# Patient Record
Sex: FEMALE | Race: WHITE | Employment: FULL TIME | URBAN - NONMETROPOLITAN AREA
[De-identification: names, ages, dates, MRNs, and addresses within clinical notes are randomized per-mention and may not be internally consistent; named-entity substitution may affect disease eponyms.]

---

## 2018-01-09 ENCOUNTER — OFFICE VISIT (OUTPATIENT)
Dept: FAMILY MEDICINE CLINIC | Facility: CLINIC | Age: 23
End: 2018-01-09

## 2018-01-09 VITALS
WEIGHT: 214 LBS | SYSTOLIC BLOOD PRESSURE: 120 MMHG | DIASTOLIC BLOOD PRESSURE: 70 MMHG | TEMPERATURE: 98 F | HEIGHT: 60.5 IN | BODY MASS INDEX: 40.93 KG/M2

## 2018-01-09 DIAGNOSIS — F51.5 NIGHTMARES: ICD-10-CM

## 2018-01-09 DIAGNOSIS — Z79.899 ENCOUNTER FOR LONG-TERM (CURRENT) USE OF MEDICATIONS: Primary | ICD-10-CM

## 2018-01-09 DIAGNOSIS — F42.2 MIXED OBSESSIONAL THOUGHTS AND ACTS: ICD-10-CM

## 2018-01-09 DIAGNOSIS — Z30.41 ENCOUNTER FOR SURVEILLANCE OF CONTRACEPTIVE PILLS: ICD-10-CM

## 2018-01-09 PROCEDURE — 99202 OFFICE O/P NEW SF 15 MIN: CPT | Performed by: FAMILY MEDICINE

## 2018-01-09 RX ORDER — PRAZOSIN HYDROCHLORIDE 1 MG/1
2 CAPSULE ORAL NIGHTLY
Qty: 60 CAPSULE | Refills: 11 | Status: SHIPPED | OUTPATIENT
Start: 2018-01-09 | End: 2019-01-04

## 2018-01-09 RX ORDER — FLUVOXAMINE MALEATE 100 MG
250 TABLET ORAL NIGHTLY
COMMUNITY
End: 2018-01-09

## 2018-01-09 RX ORDER — FLUVOXAMINE MALEATE 100 MG
250 TABLET ORAL NIGHTLY
Qty: 75 TABLET | Refills: 11 | Status: SHIPPED | OUTPATIENT
Start: 2018-01-09 | End: 2019-02-18

## 2018-01-09 NOTE — PATIENT INSTRUCTIONS
Prazosin capsules  Brand Name: Deon Novak  What is this medicine? PRAZOSIN (PRA belle sin) is an antihypertensive. It works by relaxing the blood vessels. It is used to treat high blood pressure. How should I use this medicine?   Take this medicine by tess They need to know if you have any of the following conditions:  · kidney disease  · an unusual or allergic reaction to prazosin, other medicines, foods, dyes, or preservatives  · pregnant or trying to get pregnant  · breast-feeding  What should I watch for

## 2018-01-09 NOTE — PROGRESS NOTES
Jose Hastings is a 25year old female.   Patient presents with:  Establish Care: RM 6  Medication Request: ALSO DISCUSS PROZINE-- HAS NOT TAKEN IN 2 MONTHS      HPI:   Moved here from [place]  Wants to establish locally  She  Needs meds           Has been on Items Addressed This Visit        Mental Health    Nightmares    Overview     Controlled with prazosin         Relevant Medications    Prazosin HCl 1 MG Oral Cap       Other    OCD Mixed obsessional thoughts and acts    Overview     ON FLUVOXAMINE   Contro

## 2018-02-05 ENCOUNTER — TELEPHONE (OUTPATIENT)
Dept: FAMILY MEDICINE CLINIC | Facility: CLINIC | Age: 23
End: 2018-02-05

## 2018-02-05 DIAGNOSIS — Z30.41 ENCOUNTER FOR SURVEILLANCE OF CONTRACEPTIVE PILLS: ICD-10-CM

## 2018-03-09 ENCOUNTER — TELEPHONE (OUTPATIENT)
Dept: FAMILY MEDICINE CLINIC | Facility: CLINIC | Age: 23
End: 2018-03-09

## 2018-03-09 DIAGNOSIS — Z30.41 ENCOUNTER FOR SURVEILLANCE OF CONTRACEPTIVE PILLS: ICD-10-CM

## 2018-04-03 ENCOUNTER — TELEPHONE (OUTPATIENT)
Dept: FAMILY MEDICINE CLINIC | Facility: CLINIC | Age: 23
End: 2018-04-03

## 2018-04-03 DIAGNOSIS — Z30.41 ENCOUNTER FOR SURVEILLANCE OF CONTRACEPTIVE PILLS: ICD-10-CM

## 2018-05-04 DIAGNOSIS — Z30.41 ENCOUNTER FOR SURVEILLANCE OF CONTRACEPTIVE PILLS: ICD-10-CM

## 2018-05-04 NOTE — TELEPHONE ENCOUNTER
Norethindrone-Eth Estradiol (NORTREL 1/35, 28,) 1-35 MG-MCG Oral Tab   PLEASE SEND REFILL TO [facility] IN [facility]

## 2018-05-07 RX ORDER — NORETHINDRONE AND ETHINYL ESTRADIOL 1 MG-35MCG
1 KIT ORAL DAILY
Qty: 28 TABLET | Refills: 0 | OUTPATIENT
Start: 2018-05-07

## 2018-09-16 DIAGNOSIS — Z30.41 ENCOUNTER FOR SURVEILLANCE OF CONTRACEPTIVE PILLS: ICD-10-CM

## 2018-09-17 RX ORDER — NORETHINDRONE AND ETHINYL ESTRADIOL 1 MG-35MCG
KIT ORAL
Qty: 28 TABLET | Refills: 3 | Status: SHIPPED | OUTPATIENT
Start: 2018-09-17

## 2019-02-18 DIAGNOSIS — F42.2 MIXED OBSESSIONAL THOUGHTS AND ACTS: ICD-10-CM

## 2019-02-18 NOTE — TELEPHONE ENCOUNTER
Last office visit: 01/09/2018  Last refill: 1/09/2018  Requested Prescriptions     Pending Prescriptions Disp Refills   • FLUVOXAMINE MALEATE 100 MG Oral Tab [Pharmacy Med Name: FLUVOXAMINE 100MG TABLETS] 75 tablet 0     Sig: TAKE 2 AND 1/2 TABLETS(250 MG)

## 2019-02-19 DIAGNOSIS — F42.2 MIXED OBSESSIONAL THOUGHTS AND ACTS: ICD-10-CM

## 2019-02-19 RX ORDER — FLUVOXAMINE MALEATE 100 MG
TABLET ORAL
Qty: 75 TABLET | Refills: 0 | Status: SHIPPED | OUTPATIENT
Start: 2019-02-19

## 2019-02-19 RX ORDER — FLUVOXAMINE MALEATE 100 MG
TABLET ORAL
Qty: 225 TABLET | Refills: 0 | OUTPATIENT
Start: 2019-02-19